# Patient Record
Sex: MALE | Race: WHITE | Employment: FULL TIME | URBAN - METROPOLITAN AREA
[De-identification: names, ages, dates, MRNs, and addresses within clinical notes are randomized per-mention and may not be internally consistent; named-entity substitution may affect disease eponyms.]

---

## 2020-07-11 ENCOUNTER — APPOINTMENT (OUTPATIENT)
Dept: CT IMAGING | Facility: CLINIC | Age: 65
End: 2020-07-11
Attending: EMERGENCY MEDICINE
Payer: COMMERCIAL

## 2020-07-11 ENCOUNTER — HOSPITAL ENCOUNTER (EMERGENCY)
Facility: CLINIC | Age: 65
Discharge: HOME | End: 2020-07-11
Attending: EMERGENCY MEDICINE | Admitting: EMERGENCY MEDICINE
Payer: COMMERCIAL

## 2020-07-11 VITALS
SYSTOLIC BLOOD PRESSURE: 124 MMHG | OXYGEN SATURATION: 99 % | HEART RATE: 73 BPM | DIASTOLIC BLOOD PRESSURE: 73 MMHG | RESPIRATION RATE: 13 BRPM

## 2020-07-11 DIAGNOSIS — R40.4 UNRESPONSIVE EPISODE: ICD-10-CM

## 2020-07-11 DIAGNOSIS — R55 NEAR SYNCOPE: ICD-10-CM

## 2020-07-11 LAB
ANION GAP SERPL CALCULATED.3IONS-SCNC: 5 MMOL/L (ref 3–14)
BUN SERPL-MCNC: 16 MG/DL (ref 7–30)
CALCIUM SERPL-MCNC: 8.5 MG/DL (ref 8.5–10.1)
CHLORIDE SERPL-SCNC: 105 MMOL/L (ref 94–109)
CO2 SERPL-SCNC: 29 MMOL/L (ref 20–32)
CREAT SERPL-MCNC: 1.03 MG/DL (ref 0.66–1.25)
ERYTHROCYTE [DISTWIDTH] IN BLOOD BY AUTOMATED COUNT: 12.5 % (ref 10–15)
GFR SERPL CREATININE-BSD FRML MDRD: 77 ML/MIN/{1.73_M2}
GLUCOSE SERPL-MCNC: 123 MG/DL (ref 70–99)
HCT VFR BLD AUTO: 44.7 % (ref 40–53)
HGB BLD-MCNC: 15 G/DL (ref 13.3–17.7)
MCH RBC QN AUTO: 31.4 PG (ref 26.5–33)
MCHC RBC AUTO-ENTMCNC: 33.6 G/DL (ref 31.5–36.5)
MCV RBC AUTO: 94 FL (ref 78–100)
PLATELET # BLD AUTO: 187 10E9/L (ref 150–450)
POTASSIUM SERPL-SCNC: 4 MMOL/L (ref 3.4–5.3)
RBC # BLD AUTO: 4.78 10E12/L (ref 4.4–5.9)
SODIUM SERPL-SCNC: 139 MMOL/L (ref 133–144)
TROPONIN I SERPL-MCNC: <0.015 UG/L (ref 0–0.04)
WBC # BLD AUTO: 6.2 10E9/L (ref 4–11)

## 2020-07-11 PROCEDURE — 25800030 ZZH RX IP 258 OP 636: Performed by: EMERGENCY MEDICINE

## 2020-07-11 PROCEDURE — 80048 BASIC METABOLIC PNL TOTAL CA: CPT | Performed by: EMERGENCY MEDICINE

## 2020-07-11 PROCEDURE — 85027 COMPLETE CBC AUTOMATED: CPT | Performed by: EMERGENCY MEDICINE

## 2020-07-11 PROCEDURE — 99285 EMERGENCY DEPT VISIT HI MDM: CPT | Mod: 25

## 2020-07-11 PROCEDURE — 84484 ASSAY OF TROPONIN QUANT: CPT | Performed by: EMERGENCY MEDICINE

## 2020-07-11 PROCEDURE — 93005 ELECTROCARDIOGRAM TRACING: CPT

## 2020-07-11 PROCEDURE — 70450 CT HEAD/BRAIN W/O DYE: CPT

## 2020-07-11 PROCEDURE — 96360 HYDRATION IV INFUSION INIT: CPT

## 2020-07-11 RX ADMIN — SODIUM CHLORIDE 1000 ML: 9 INJECTION, SOLUTION INTRAVENOUS at 10:52

## 2020-07-11 ASSESSMENT — ENCOUNTER SYMPTOMS
NAUSEA: 1
DIAPHORESIS: 1
PALPITATIONS: 0
BLOOD IN STOOL: 0
SHORTNESS OF BREATH: 0

## 2020-07-11 NOTE — ED PROVIDER NOTES
"  History     Chief Complaint:  Near Syncope    The history is provided by the patient and the spouse.      Nicholas Baltazar is a 64 year old otherwise healthy male, who has not been evaluated by a doctor in 2-3 years, who presents with his wife for evaluation of a near syncopal episode and altered mental status. Patient reports that he woke up this morning and felt somewhat nauseous, but was able to tolerate a cup of coffee. Patient states he went to go sit in the study and notes he remembers his wife coming into the study to ask him questions. He states he believes he was still awake, but then became acutely diaphoretic, thus wife called EMS.    EMS recommended transport to the ED, but wife wanted to take patient to the ED herself. By the time EMS arrived, patient was alert.     Here, wife states that she went into the study to ask patient a question. She notes he was staring at the ceiling and thought he was joking around, but when she went to jostle his arm, patient's arm \"began to move weirdly.\" Wife tried to ask questions again, but patient seemed to only grunt in response, thus she called EMS. She states that this episode lasted approximately 1-2 minutes and then gradually returned to his baseline. Patient denies any chest pain, palpitations, urinary incontinence, loss of bowel control, dark/bloody stools or shortness of breath. He reports it currently is July.     Allergies:  No Known Drug Allergies     Medications:    The patient is not currently taking any prescribed medications.      Past Medical History:    History reviewed. No pertinent past medical history.     Past Surgical History:    History reviewed. No pertinent past surgical history.     Family History:    History reviewed. No pertinent family history.       Social History:  The patient was accompanied to the ED by wife.  Alcohol Use: Yes - 1 beer/daily  Drug Use: None  Marital Status:       Review of Systems   Constitutional: Positive " for diaphoresis.   Respiratory: Negative for shortness of breath.    Cardiovascular: Negative for chest pain and palpitations.   Gastrointestinal: Positive for nausea. Negative for blood in stool.        No melena; no loss of bowel control   Genitourinary:        No urinary incontinence   Neurological: Positive for syncope (near).   Psychiatric/Behavioral: Positive for behavioral problems.   All other systems reviewed and are negative.      Physical Exam     Patient Vitals for the past 24 hrs:   BP Pulse Heart Rate Resp SpO2   07/11/20 1130 133/74 73 -- -- 97 %   07/11/20 1100 -- -- 71 21 99 %       Physical Exam    General:   Pleasant, age appropriate male.  HEENT:    Oropharynx is moist, without lesions or trismus.     No tongue abrasion/laceration  Eyes:    Conjunctiva normal     PERRL; EOMs intact  Neck:    Supple, no meningismus.     CV:     Regular rate and rhythm.      No murmurs, rubs or gallops.       No carotid bruit     2+ radial pulses bilateral.       No lower extremity edema.  PULM:    Clear to auscultation bilateral.       No respiratory distress.      Good air exchange.     No rales or wheezing.  ABD:    Soft, non-tender, non-distended.       No pulsatile masses.       No rebound, guarding or rigidity.  MSK:     No gross deformity to all four extremities.   LYMPH:   No cervical lymphadenopathy.  NEURO:   Alert & O x 3.      CN II-XII intact, speech is clear with no aphasia.       Finger to nose within normal limits.  No pronator drift.       Strength is 5/5 in all 4 extremities.  Sensation is intact.       Normal muscular tone, no tremor.  Skin:    Warm, dry and intact.    Psych:    Mood is good and affect is appropriate.        Emergency Department Course     ECG:  Indication: Near Syncope  ECG taken at 1118, ECG read at 1123 by Dr. Chencho MD  Normal sinus rhythm  Normal ECG  Rate 73 bpm. AL interval 158. QRS duration 74. QT/QTc 408/449. P-R-T axes 59 46 41.      Imaging:  Radiology findings were  communicated with the patient and family who voiced understanding of the findings.    Head CT w/o Contrast:  IMPRESSION: No acute pathology, no bleed, mass, or acute infarcts.   Reading per radiology.     Laboratory:  Laboratory findings were communicated with the patient and family who voiced understanding of the findings.    CBC: AWNL (WBC 6.2, HGB 15.0, )  BMP: Glucose 123 (H) o/w WNL (Creatinine 1.03)  Troponin (Collected 1024): <0.015      Interventions:  1052 0.9% NaCl Bolus 1000 mL IV     Emergency Department Course:  Past medical records, nursing notes, and vitals reviewed.    (1018)   I performed an exam of the patient as documented above. History obtained from patient and wife.    IV was inserted and blood was drawn for laboratory testing, results above.    The patient was sent for a Head CT w/o Contrast while in the emergency department, results above.     EKG obtained in the ED, see results above.     (1143)   I rechecked the patient and discussed the results of his workup thus far. Discussed plan of care and patient will be discharged.     Findings and plan explained to the Patient and spouse. Patient discharged home with instructions regarding supportive care, medications, and reasons to return. The importance of close follow-up was reviewed.     I personally reviewed the laboratory and imaging results with the Patient and spouse and answered all related questions prior to discharge.     Impression & Plan     Medical Decision Makin-year-old male presented to the ED with an episode of decreased responsiveness and concerns of near syncope.  He has no convincing evidence that this represents atypical seizure such as absence seizure.  No history of seizure disorder.  No medications or drug use to lower the seizure threshold.  No electrolyte disturbance.  Head CT unremarkable.  Evaluation was undertaken for possible cardiac causes for near syncope, EKG reveals no dysrhythmia or cardiac  conduction defects.  No anemia or intravascular volume depletion.  The exact cause of his symptoms are uncertain.  This may represent simple vasovagal pathology.  Patient feels well and is back to his baseline.  Patient is comfortable with discharge home.  I will order outpatient echocardiogram.  Patient to closely follow with primary care physician and will determine the need for carotid artery ultrasound or Holter monitoring.  Patient safe for discharge.    Diagnosis:    ICD-10-CM    1. Near syncope  R55 Echocardiogram Complete   2. Unresponsive episode  R41.89        Disposition:  Discharged to home.    Scribe Disclosure:  I, Jennifer Pino, am serving as a scribe at 10:18 AM on 7/11/2020 to document services personally performed by Oswaldo Paiz MD based on my observations and the provider's statements to me.   7/11/2020   [facility] EMERGENCY DEPARTMENT       Oswaldo Paiz MD  07/11/20 0972

## 2020-07-11 NOTE — DISCHARGE INSTRUCTIONS
I have ordered an outpatient echocardiogram or ultrasound of your heart to ensure there are no heart valve problems.  They will call you to arrange a time to have this done.    Discharge Instructions  Syncope    Syncope (fainting) is a sudden, short loss of consciousness (passing out spell). People will usually fall to the ground when they faint or slump over if seated.  People may also shake when this happens, and it can sometimes be difficult to tell the difference between syncope and a seizure. At this time, your provider does not find a reason to suspect that your fainting spell is a sign of anything dangerous or life-threatening.  However, sometimes the signs of serious illness do not show up right away.     Generally, every Emergency Department visit should have a follow-up clinic visit with either a primary or a specialty clinic/provider. Please follow-up as instructed by your emergency provider today.    Return to the Emergency Department if:  You faint again.   You have any significant bleeding.  You have chest pain or a fast or irregular heartbeat.  You feel short of breath.  You cough up any blood.  You have abdominal (belly) pain or unusual back pain.  You have ongoing vomiting (throwing up) or diarrhea (loose stools).  You have a black or tarry bowel movement, or blood in the stool or in your vomit.  You have a fever over 101 F.  You lose feeling or cannot move a part of your body or cannot talk normally.  You are confused, have a headache, cannot see well, or have a seizure.  DO NOT DRIVE. CALL 911 INSTEAD!    What can I do to help myself?  Follow any specific instructions that your provider discussed with you.  If you feel light-headed, make sure to sit down right away, even if you have to sit on the floor.  Follow up with your regular medical provider as discussed for further management. This may include lowering your blood pressure medications, insulin or other diabetic medications, checking your  blood sugar more frequently, and drinking more fluids, taking medicines for vomiting or diarrhea or getting up slower.  If you were given a prescription for medicine here today, be sure to read all of the information (including the package insert) that comes with your prescription.  This will include important information about the medicine, its side effects, and any warnings that you need to know about.  The pharmacist who fills the prescription can provide more information and answer questions you may have about the medicine.  If you have questions or concerns that the pharmacist cannot address, please call or return to the Emergency Department.   Remember that you can always come back to the Emergency Department if you are not able to see your regular provider in the amount of time listed above, if you get any new symptoms, or if there is anything that worries you.

## 2020-07-11 NOTE — ED AVS SNAPSHOT
[facility] Emergency Department  [address]  [facility] [address]  Phone:  [phone]  Fax:  [phone]                                    Nicholas Baltazar   MRN: [id number]    Department:  [facility] Emergency Department   Date of Visit:  7/11/2020           After Visit Summary Signature Page    I have received my discharge instructions, and my questions have been answered. I have discussed any challenges I see with this plan with the nurse or doctor.    ..........................................................................................................................................  Patient/Patient Representative Signature      ..........................................................................................................................................  Patient Representative Print Name and Relationship to Patient    ..................................................               ................................................  Date                                   Time    ..........................................................................................................................................  Reviewed by Signature/Title    ...................................................              ..............................................  Date                                               Time          22EPIC Rev 08/18

## 2020-07-11 NOTE — ED TRIAGE NOTES
"ABCs intact. Pt was sitting in a chair. Pt states he became hot, and then diaphoretic. Pt's wife states he \"wasn't there but his eyes were open. He only was grunting\". Pt's wife states episode lasted about 5 minutes. EMS was on scene and advised pt to go to er.   "

## 2020-07-13 LAB — INTERPRETATION ECG - MUSE: NORMAL
